# Patient Record
Sex: MALE
[De-identification: names, ages, dates, MRNs, and addresses within clinical notes are randomized per-mention and may not be internally consistent; named-entity substitution may affect disease eponyms.]

---

## 2019-03-12 ENCOUNTER — APPOINTMENT (OUTPATIENT)
Dept: OTOLARYNGOLOGY | Facility: CLINIC | Age: 59
End: 2019-03-12
Payer: COMMERCIAL

## 2019-03-12 PROBLEM — Z00.00 ENCOUNTER FOR PREVENTIVE HEALTH EXAMINATION: Status: ACTIVE | Noted: 2019-03-12

## 2019-03-12 PROCEDURE — 99213 OFFICE O/P EST LOW 20 MIN: CPT

## 2019-03-12 RX ORDER — ALFUZOSIN HYDROCHLORIDE 10 MG/1
10 TABLET, EXTENDED RELEASE ORAL
Qty: 30 | Refills: 0 | Status: ACTIVE | COMMUNITY
Start: 2018-10-29

## 2019-03-12 RX ORDER — OFLOXACIN OTIC 3 MG/ML
0.3 SOLUTION AURICULAR (OTIC) TWICE DAILY
Qty: 1 | Refills: 1 | Status: ACTIVE | COMMUNITY
Start: 2019-03-12 | End: 1900-01-01

## 2019-03-12 RX ORDER — OFLOXACIN OTIC 3 MG/ML
0.3 SOLUTION AURICULAR (OTIC)
Qty: 5 | Refills: 0 | Status: ACTIVE | COMMUNITY
Start: 2018-10-24

## 2019-03-12 NOTE — ASSESSMENT
[FreeTextEntry1] : Mild infection of the right ear. Recommend dry ear precautions and otic drops. Followup as needed.

## 2019-03-12 NOTE — PHYSICAL EXAM
[FreeTextEntry1] : Procedure: Microscopic Ear Exam\par \par Left ear:  \par Anterior and posterior tympanic membrane retraction without inflammation. Possible small effusion. Contact with the promontory noted. No acute inflammation.\par \par \par Right ear:  \par Canal down cavity. Skin lined. Subtotal tympanic membrane perforation with mild inflammation of the tympanic membrane remnant and middle ear mucosa. No mass lesion.\par  [Midline] : trachea located in midline position [Normal] : no neck adenopathy

## 2019-03-12 NOTE — HISTORY OF PRESENT ILLNESS
[de-identified] : MAREN OKEEFE is a man with a history of Chronic infectious ear disease affecting both ears. Presents with increasing discomfort in the right ear with intermittent discomfort in the left. Occasional otorrhea. No change in hearing. No cyanosis, clubbing or edema

## 2019-04-24 ENCOUNTER — APPOINTMENT (OUTPATIENT)
Dept: OTOLARYNGOLOGY | Facility: CLINIC | Age: 59
End: 2019-04-24

## 2019-08-07 ENCOUNTER — RX RENEWAL (OUTPATIENT)
Age: 59
End: 2019-08-07

## 2019-09-04 ENCOUNTER — APPOINTMENT (OUTPATIENT)
Dept: OTOLARYNGOLOGY | Facility: CLINIC | Age: 59
End: 2019-09-04

## 2020-04-27 ENCOUNTER — APPOINTMENT (OUTPATIENT)
Dept: OTOLARYNGOLOGY | Facility: CLINIC | Age: 60
End: 2020-04-27
Payer: COMMERCIAL

## 2020-04-27 DIAGNOSIS — H93.299 OTHER ABNORMAL AUDITORY PERCEPTIONS, UNSPECIFIED EAR: ICD-10-CM

## 2020-04-27 PROCEDURE — 92557 COMPREHENSIVE HEARING TEST: CPT

## 2020-04-27 PROCEDURE — 69220 CLEAN OUT MASTOID CAVITY: CPT

## 2020-04-27 PROCEDURE — 92550 TYMPANOMETRY & REFLEX THRESH: CPT

## 2020-04-27 PROCEDURE — 99213 OFFICE O/P EST LOW 20 MIN: CPT | Mod: 25

## 2020-04-27 RX ORDER — OFLOXACIN OTIC 3 MG/ML
0.3 SOLUTION AURICULAR (OTIC)
Qty: 2 | Refills: 5 | Status: ACTIVE | COMMUNITY
Start: 2018-10-24 | End: 1900-01-01

## 2020-04-27 NOTE — ASSESSMENT
[FreeTextEntry1] : Stable infectious ear disease bilaterally. Judicious use of otic drops recommended as needed. Routine followup recommended for mastoid maintenance.\par \par Options for hearing amplification discussed in detail.

## 2020-04-27 NOTE — HISTORY OF PRESENT ILLNESS
[de-identified] : MAREN OKEEFE is a man with a history of Chronic infectious ear disease affecting both ears.   [FreeTextEntry1] : 04/27/2020 \par No change in hearing.  reports intermittent discomfort but no pain in the right ear.  No otorrhea.

## 2020-04-27 NOTE — PHYSICAL EXAM
[Midline] : trachea located in midline position [Normal] : no neck adenopathy [FreeTextEntry1] : Procedure: Microscopic Ear Exam\par \par Left ear:  \par Anterior and posterior tympanic membrane retraction without inflammation. Possible small effusion. Contact with the promontory noted. No acute inflammation.\par \par \par Right ear:  \par Canal down cavity. Skin lined. Subtotal tympanic membrane perforation with no inflammation of the tympanic membrane remnant or middle ear mucosa. No mass lesion.\par

## 2020-04-27 NOTE — DATA REVIEWED
[de-identified] : Complete audiometry was ordered and completed today. This was separately reported by the audiologist. The results were reviewed in detail with the patient.\par \par

## 2020-08-19 ENCOUNTER — APPOINTMENT (OUTPATIENT)
Dept: OTOLARYNGOLOGY | Facility: CLINIC | Age: 60
End: 2020-08-19
Payer: COMMERCIAL

## 2020-08-19 PROCEDURE — 69220 CLEAN OUT MASTOID CAVITY: CPT

## 2020-08-19 PROCEDURE — 99213 OFFICE O/P EST LOW 20 MIN: CPT | Mod: 25

## 2020-08-19 NOTE — ASSESSMENT
[FreeTextEntry1] : Reasonably stable right mastoid cavity requiring interval maintenance. Ear hygiene and otic drops used to reviewed. Followup recommended in 6 months.

## 2020-08-19 NOTE — PHYSICAL EXAM
[de-identified] : i [FreeTextEntry1] : Procedure: Microscopic Ear Exam with mastoid debridement\par \par Left ear:  \par Anterior and posterior tympanic membrane retraction without inflammation. Possible small effusion. Contact with the promontory noted. No acute inflammation.\par \par \par Right ear:  \par Canal down cavity. Skin lined. Retained keratin and cerumen debrided with suction. Subtotal tympanic membrane perforation with no inflammation of the tympanic membrane remnant or middle ear mucosa. No mass lesion.\par  [Normal] : no rashes

## 2020-08-19 NOTE — HISTORY OF PRESENT ILLNESS
[de-identified] : MAREN OKEEFE is a man with a history of Chronic infectious ear disease affecting both ears.   [FreeTextEntry1] : 08/19/2020\par occasional otorrhea in the right ear responsive to otic drops.  Now with no pain or otorrhea.  No change in hearing.

## 2021-12-06 ENCOUNTER — APPOINTMENT (OUTPATIENT)
Dept: OTOLARYNGOLOGY | Facility: CLINIC | Age: 61
End: 2021-12-06
Payer: COMMERCIAL

## 2021-12-06 DIAGNOSIS — H72.92 UNSPECIFIED PERFORATION OF TYMPANIC MEMBRANE, LEFT EAR: ICD-10-CM

## 2021-12-06 DIAGNOSIS — H95.191 OTHER DISORDERS FOLLOWING MASTOIDECTOMY, RIGHT EAR: ICD-10-CM

## 2021-12-06 DIAGNOSIS — H90.6 MIXED CONDUCTIVE AND SENSORINEURAL HEARING LOSS, BILATERAL: ICD-10-CM

## 2021-12-06 DIAGNOSIS — H66.93 OTITIS MEDIA, UNSPECIFIED, BILATERAL: ICD-10-CM

## 2021-12-06 PROCEDURE — 92557 COMPREHENSIVE HEARING TEST: CPT

## 2021-12-06 PROCEDURE — 92567 TYMPANOMETRY: CPT

## 2021-12-06 PROCEDURE — 69220 CLEAN OUT MASTOID CAVITY: CPT

## 2021-12-06 PROCEDURE — 99214 OFFICE O/P EST MOD 30 MIN: CPT | Mod: 25

## 2021-12-06 RX ORDER — OFLOXACIN OTIC 3 MG/ML
0.3 SOLUTION AURICULAR (OTIC) TWICE DAILY
Qty: 1 | Refills: 2 | Status: ACTIVE | COMMUNITY
Start: 2021-12-06 | End: 1900-01-01

## 2021-12-06 NOTE — HISTORY OF PRESENT ILLNESS
[de-identified] : MAREN OKEEFE is a man with a history of Chronic infectious ear disease affecting both ears.   [FreeTextEntry1] : 12/06/2021 \par occasional otorrhea in the right ear responsive to otic drops.  Now with no pain or otorrhea.  hearing adequate in the left ear.

## 2021-12-06 NOTE — DATA REVIEWED
[de-identified] : To investigate perceived hearing loss, Complete audiometry was ordered and completed today. I have interpreted these results and reviewed them in detail with the patient.\par \par significant conductive hearing loss, remains in the right ear

## 2021-12-06 NOTE — ASSESSMENT
[FreeTextEntry1] : Improved hearing in the left ear noted. Continued chronic inflammation in the right ear, associated with a non-debrided mastoid cavity. He was treated today with mastoid debridement.\par \par Intermittent use of otic drops for otorrhea advised. Followup recommended in 6 months and as needed.

## 2021-12-06 NOTE — PHYSICAL EXAM
[Normal] : temporomandibular joint is normal [FreeTextEntry1] : Microscopic ear exam with Mastoid debridement:\par \par Right ear: \par Canal down cavity.\par Retained keratin and cerumen debrided with a curet and alligator forcep and suction. Cavity is dry and lined by skin.\par Acute inflammation of the medial canal with apparent subtotal perforation and mild acute inflammation.\par \par Left ear: The ear canal was patent and nonobstructed.  The tympanic membrane was perforated. Anteriorly, this is a small perforation. Noninflamed \par